# Patient Record
Sex: FEMALE | Race: WHITE | URBAN - METROPOLITAN AREA
[De-identification: names, ages, dates, MRNs, and addresses within clinical notes are randomized per-mention and may not be internally consistent; named-entity substitution may affect disease eponyms.]

---

## 2020-10-26 ENCOUNTER — NEW PATIENT (OUTPATIENT)
Dept: URBAN - METROPOLITAN AREA CLINIC 64 | Facility: CLINIC | Age: 20
End: 2020-10-26
Payer: COMMERCIAL

## 2020-10-26 DIAGNOSIS — Z57.9 OCCUPATIONAL EXPOSURE TO UNSPECIFIED RISK FACTOR: Primary | ICD-10-CM

## 2020-10-26 PROCEDURE — 92004 COMPRE OPH EXAM NEW PT 1/>: CPT | Performed by: OPTOMETRIST

## 2020-10-26 SDOH — HEALTH STABILITY - PHYSICAL HEALTH: OCCUPATIONAL EXPOSURE TO UNSPECIFIED RISK FACTOR: Z57.9

## 2020-10-26 ASSESSMENT — INTRAOCULAR PRESSURE
OS: 14
OD: 15

## 2020-10-26 ASSESSMENT — KERATOMETRY
OD: 45.06
OS: 45.49

## 2020-10-26 ASSESSMENT — VISUAL ACUITY
OS: 20/20
OD: 20/20

## 2021-10-01 ENCOUNTER — OFFICE VISIT (OUTPATIENT)
Dept: URBAN - METROPOLITAN AREA CLINIC 64 | Facility: CLINIC | Age: 21
End: 2021-10-01
Payer: COMMERCIAL

## 2021-10-01 DIAGNOSIS — H16.223 KERATOCONJUNCTIVITIS SICCA, BILATERAL: Primary | ICD-10-CM

## 2021-10-01 PROCEDURE — 99214 OFFICE O/P EST MOD 30 MIN: CPT | Performed by: OPTOMETRIST

## 2021-10-01 ASSESSMENT — INTRAOCULAR PRESSURE
OS: 15
OD: 16

## 2021-10-01 ASSESSMENT — KERATOMETRY
OD: 44.80
OS: 45.37

## 2021-10-01 ASSESSMENT — VISUAL ACUITY
OS: 20/20
OD: 20/20

## 2021-10-01 NOTE — IMPRESSION/PLAN
Impression: Keratoconjunctivitis sicca, bilateral: H22.265. Plan: Patient's complaint is consistent with dry eye syndrome. There is no evidence of permanent changes to the cornea. Explained there are ways to improve the symptoms but no permanent fix for the symptoms. Reviewed options including topical treatments, punctal plugs and permanent punctal occlusion. Patient currently  using At[employer]'s PRN.

## 2021-12-30 ENCOUNTER — REFRACTIVE (OUTPATIENT)
Dept: URBAN - METROPOLITAN AREA CLINIC 64 | Facility: CLINIC | Age: 21
End: 2021-12-30

## 2021-12-30 ASSESSMENT — INTRAOCULAR PRESSURE
OS: 17
OD: 19

## 2021-12-30 ASSESSMENT — VISUAL ACUITY
OS: 20/20
OD: 20/20

## 2021-12-30 ASSESSMENT — KERATOMETRY
OS: 45.62
OD: 44.95

## 2022-01-24 ENCOUNTER — OFFICE VISIT (OUTPATIENT)
Dept: URBAN - METROPOLITAN AREA CLINIC 64 | Facility: CLINIC | Age: 22
End: 2022-01-24
Payer: COMMERCIAL

## 2022-01-24 ASSESSMENT — INTRAOCULAR PRESSURE
OD: 16
OS: 16

## 2022-01-24 ASSESSMENT — VISUAL ACUITY
OS: 20/20
OD: 20/20

## 2022-01-24 NOTE — IMPRESSION/PLAN
Impression: Myopia, bilateral: H52.13. Plan: Recommend spherical ICL OU, Aim OD: plano AIm OS: plano. Discussed R/b/a's of ICL with patient. Discussed LPI. Discussed presbyopia, but that it will be some time before patient would need any reading glasses. Discussed potential formation or progression of cataracts and possible need for further surgery if cataracts form.

## 2022-01-28 ENCOUNTER — ADULT PHYSICAL (OUTPATIENT)
Dept: URBAN - METROPOLITAN AREA CLINIC 64 | Facility: CLINIC | Age: 22
End: 2022-01-28
Payer: COMMERCIAL

## 2022-01-28 DIAGNOSIS — Z01.818 ENCOUNTER FOR OTHER PREPROCEDURAL EXAMINATION: Primary | ICD-10-CM

## 2022-01-28 PROCEDURE — 99203 OFFICE O/P NEW LOW 30 MIN: CPT | Performed by: NURSE PRACTITIONER

## 2022-02-08 ENCOUNTER — SURGERY (OUTPATIENT)
Dept: URBAN - METROPOLITAN AREA SURGERY 42 | Facility: SURGERY | Age: 22
End: 2022-02-08
Payer: COMMERCIAL

## 2022-02-08 ENCOUNTER — POST-OPERATIVE VISIT (OUTPATIENT)
Dept: URBAN - METROPOLITAN AREA CLINIC 64 | Facility: CLINIC | Age: 22
End: 2022-02-08
Payer: COMMERCIAL

## 2022-02-08 PROCEDURE — 99024 POSTOP FOLLOW-UP VISIT: CPT | Performed by: OPTOMETRIST

## 2022-02-08 PROCEDURE — SURGI PHAKIC IOL SURGEON'S FEE: CUSTOM | Performed by: OPHTHALMOLOGY

## 2022-02-08 NOTE — IMPRESSION/PLAN
Impression: S/P ICL implant OD - . Encounter for surgical aftercare following surgery on a sense organ  Z48.810.  Excellent post op course Plan:

## 2022-02-09 ENCOUNTER — POST-OPERATIVE VISIT (OUTPATIENT)
Dept: URBAN - METROPOLITAN AREA CLINIC 64 | Facility: CLINIC | Age: 22
End: 2022-02-09
Payer: COMMERCIAL

## 2022-02-09 PROCEDURE — 99024 POSTOP FOLLOW-UP VISIT: CPT | Performed by: OPTOMETRIST

## 2022-02-09 ASSESSMENT — INTRAOCULAR PRESSURE
OD: 13
OS: 10

## 2022-02-09 NOTE — IMPRESSION/PLAN
Impression: S/P ICL implant OD - 1 Day. Encounter for surgical aftercare following surgery on a sense organ  Z48.810. Plan: Patient is doing well. I week post op. Ofloxacin  ou qid x 1 wk Prednisolone ou x 1 wk

## 2022-02-16 ENCOUNTER — POST-OPERATIVE VISIT (OUTPATIENT)
Dept: URBAN - METROPOLITAN AREA CLINIC 64 | Facility: CLINIC | Age: 22
End: 2022-02-16
Payer: COMMERCIAL

## 2022-02-16 DIAGNOSIS — H52.13 MYOPIA, BILATERAL: ICD-10-CM

## 2022-02-16 PROCEDURE — 99024 POSTOP FOLLOW-UP VISIT: CPT | Performed by: OPTOMETRIST

## 2022-02-16 ASSESSMENT — INTRAOCULAR PRESSURE
OS: 18
OD: 16

## 2022-02-16 ASSESSMENT — VISUAL ACUITY
OD: 20/20
OS: 20/20

## 2022-02-16 NOTE — IMPRESSION/PLAN
Impression: S/P ICL implant OD - 8 Days. Encounter for surgical aftercare following surgery on a sense organ  Z48.810. Plan: patients IOP good today. Vision is good today. RYTC 1 month.

## 2022-03-14 ENCOUNTER — POST-OPERATIVE VISIT (OUTPATIENT)
Dept: URBAN - METROPOLITAN AREA CLINIC 64 | Facility: CLINIC | Age: 22
End: 2022-03-14
Payer: COMMERCIAL

## 2022-03-14 DIAGNOSIS — Z48.810 ENCOUNTER FOR SURGICAL AFTERCARE FOLLOWING SURGERY ON A SENSE ORGAN: Primary | ICD-10-CM

## 2022-03-14 PROCEDURE — 99024 POSTOP FOLLOW-UP VISIT: CPT | Performed by: OPTOMETRIST

## 2022-03-14 ASSESSMENT — INTRAOCULAR PRESSURE
OS: 21
OD: 22

## 2022-03-14 NOTE — IMPRESSION/PLAN
Impression:  Encounter for surgical aftercare following surgery on a sense organ  Z48.810. Plan: S/P ICL OU - doing well. RTC 6 months PO.